# Patient Record
Sex: MALE | Race: WHITE | Employment: UNEMPLOYED | URBAN - METROPOLITAN AREA
[De-identification: names, ages, dates, MRNs, and addresses within clinical notes are randomized per-mention and may not be internally consistent; named-entity substitution may affect disease eponyms.]

---

## 2019-04-10 ENCOUNTER — OFFICE VISIT (OUTPATIENT)
Dept: ORTHOPEDIC SURGERY | Facility: CLINIC | Age: 44
End: 2019-04-10

## 2019-04-10 VITALS
OXYGEN SATURATION: 97 % | WEIGHT: 210 LBS | HEART RATE: 76 BPM | RESPIRATION RATE: 16 BRPM | TEMPERATURE: 97.5 F | HEIGHT: 68 IN | BODY MASS INDEX: 31.83 KG/M2 | SYSTOLIC BLOOD PRESSURE: 124 MMHG | DIASTOLIC BLOOD PRESSURE: 85 MMHG

## 2019-04-10 DIAGNOSIS — S73.191A TEAR OF RIGHT ACETABULAR LABRUM, INITIAL ENCOUNTER: ICD-10-CM

## 2019-04-10 DIAGNOSIS — M16.11 PRIMARY OSTEOARTHRITIS OF RIGHT HIP: ICD-10-CM

## 2019-04-10 DIAGNOSIS — M25.551 RIGHT HIP PAIN: Primary | ICD-10-CM

## 2019-04-10 RX ORDER — TRIAMCINOLONE ACETONIDE 40 MG/ML
40 INJECTION, SUSPENSION INTRA-ARTICULAR; INTRAMUSCULAR ONCE
Qty: 1 ML | Refills: 0 | Status: CANCELLED
Start: 2019-04-10 | End: 2019-04-10
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
## 2019-04-19 ENCOUNTER — HOSPITAL ENCOUNTER (OUTPATIENT)
Dept: GENERAL RADIOLOGY | Age: 44
Discharge: HOME | End: 2019-04-19
Attending: ORTHOPAEDIC SURGERY
Payer: OTHER GOVERNMENT

## 2019-04-19 DIAGNOSIS — M25.551 RIGHT HIP PAIN: ICD-10-CM

## 2019-04-19 DIAGNOSIS — M16.11 PRIMARY OSTEOARTHRITIS OF RIGHT HIP: ICD-10-CM

## 2019-04-19 PROCEDURE — 77002 NEEDLE LOCALIZATION BY XRAY: CPT

## 2019-04-19 PROCEDURE — 74011250636 HC RX REV CODE- 250/636: Performed by: ORTHOPAEDIC SURGERY

## 2019-04-19 PROCEDURE — 74011636320 HC RX REV CODE- 636/320: Performed by: ORTHOPAEDIC SURGERY

## 2019-04-19 RX ORDER — METHYLPREDNISOLONE ACETATE 40 MG/ML
40 INJECTION, SUSPENSION INTRA-ARTICULAR; INTRALESIONAL; INTRAMUSCULAR; SOFT TISSUE
Status: COMPLETED | OUTPATIENT
Start: 2019-04-19 | End: 2019-04-19

## 2019-04-19 RX ORDER — LIDOCAINE HYDROCHLORIDE 10 MG/ML
30 INJECTION, SOLUTION EPIDURAL; INFILTRATION; INTRACAUDAL; PERINEURAL
Status: COMPLETED | OUTPATIENT
Start: 2019-04-19 | End: 2019-04-19

## 2019-04-19 RX ADMIN — LIDOCAINE HYDROCHLORIDE 15 ML: 10 INJECTION, SOLUTION EPIDURAL; INFILTRATION; INTRACAUDAL; PERINEURAL at 14:02

## 2019-04-19 RX ADMIN — METHYLPREDNISOLONE ACETATE 40 MG: 40 INJECTION, SUSPENSION INTRA-ARTICULAR; INTRALESIONAL; INTRAMUSCULAR; SOFT TISSUE at 14:01

## 2019-04-19 RX ADMIN — IOPAMIDOL 10 ML: 408 INJECTION, SOLUTION INTRATHECAL at 14:01

## 2019-05-15 ENCOUNTER — OFFICE VISIT (OUTPATIENT)
Dept: ORTHOPEDIC SURGERY | Facility: CLINIC | Age: 44
End: 2019-05-15

## 2019-05-15 VITALS
DIASTOLIC BLOOD PRESSURE: 77 MMHG | HEART RATE: 66 BPM | OXYGEN SATURATION: 95 % | SYSTOLIC BLOOD PRESSURE: 119 MMHG | HEIGHT: 68 IN | WEIGHT: 202 LBS | TEMPERATURE: 97.6 F | BODY MASS INDEX: 30.62 KG/M2 | RESPIRATION RATE: 16 BRPM

## 2019-05-15 DIAGNOSIS — M16.11 PRIMARY OSTEOARTHRITIS OF RIGHT HIP: ICD-10-CM

## 2019-05-15 DIAGNOSIS — S73.191A TEAR OF RIGHT ACETABULAR LABRUM, INITIAL ENCOUNTER: ICD-10-CM

## 2019-05-15 DIAGNOSIS — M25.551 RIGHT HIP PAIN: Primary | ICD-10-CM
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
## 2019-05-28 ENCOUNTER — HOSPITAL ENCOUNTER (OUTPATIENT)
Dept: GENERAL RADIOLOGY | Age: 44
Discharge: HOME | End: 2019-05-28
Attending: ORTHOPAEDIC SURGERY
Payer: OTHER GOVERNMENT

## 2019-05-28 ENCOUNTER — HOSPITAL ENCOUNTER (OUTPATIENT)
Dept: MRI IMAGING | Age: 44
Discharge: HOME | End: 2019-05-28
Attending: ORTHOPAEDIC SURGERY
Payer: OTHER GOVERNMENT

## 2019-05-28 DIAGNOSIS — S73.191A TEAR OF RIGHT ACETABULAR LABRUM, INITIAL ENCOUNTER: ICD-10-CM

## 2019-05-28 DIAGNOSIS — M25.551 RIGHT HIP PAIN: ICD-10-CM

## 2019-05-28 PROCEDURE — 73722 MRI JOINT OF LWR EXTR W/DYE: CPT

## 2019-05-28 PROCEDURE — 74011000250 HC RX REV CODE- 250: Performed by: ORTHOPAEDIC SURGERY

## 2019-05-28 PROCEDURE — A9575 INJ GADOTERATE MEGLUMI 0.1ML: HCPCS | Performed by: ORTHOPAEDIC SURGERY

## 2019-05-28 PROCEDURE — 77002 NEEDLE LOCALIZATION BY XRAY: CPT

## 2019-05-28 PROCEDURE — 74011250636 HC RX REV CODE- 250/636: Performed by: ORTHOPAEDIC SURGERY

## 2019-05-28 PROCEDURE — 74011636320 HC RX REV CODE- 636/320: Performed by: ORTHOPAEDIC SURGERY

## 2019-05-28 RX ORDER — SODIUM CHLORIDE 9 MG/ML
10 INJECTION INTRAMUSCULAR; INTRAVENOUS; SUBCUTANEOUS
Status: COMPLETED | OUTPATIENT
Start: 2019-05-28 | End: 2019-05-28

## 2019-05-28 RX ORDER — LIDOCAINE HYDROCHLORIDE 10 MG/ML
30 INJECTION, SOLUTION EPIDURAL; INFILTRATION; INTRACAUDAL; PERINEURAL
Status: COMPLETED | OUTPATIENT
Start: 2019-05-28 | End: 2019-05-28

## 2019-05-28 RX ORDER — GADOTERATE MEGLUMINE 376.9 MG/ML
5 INJECTION INTRAVENOUS
Status: COMPLETED | OUTPATIENT
Start: 2019-05-28 | End: 2019-05-28

## 2019-05-28 RX ADMIN — LIDOCAINE HYDROCHLORIDE 10 ML: 10 INJECTION, SOLUTION EPIDURAL; INFILTRATION; INTRACAUDAL; PERINEURAL at 09:57

## 2019-05-28 RX ADMIN — GADOTERATE MEGLUMINE 0.15 ML: 376.9 INJECTION INTRAVENOUS at 09:56

## 2019-05-28 RX ADMIN — IOPAMIDOL 10 ML: 408 INJECTION, SOLUTION INTRATHECAL at 09:57

## 2019-05-28 RX ADMIN — SODIUM CHLORIDE 10 ML: 9 INJECTION, SOLUTION INTRAMUSCULAR; INTRAVENOUS; SUBCUTANEOUS at 09:56

## 2019-07-22 ENCOUNTER — TELEPHONE (OUTPATIENT)
Dept: ORTHOPEDIC SURGERY | Facility: CLINIC | Age: 44
End: 2019-07-22

## 2019-07-22 NOTE — TELEPHONE ENCOUNTER
Patient called for Danny Maldonado. Patient said he had his MRI done for his Right Hip at [facility] over a month ago, and has not heard from anyone. That no one has called him. Patient was advised to schedule an appointment, but the patient repeated that no one has called him. That it has been a month since he had the Mri done. Patient would like a call back with the results. Patient tel. [phone]. Note : [facility]. His [facility] Referral will  on 2019.   Patient aware Danny Maldnoado is in [place].

## 2019-07-23 NOTE — TELEPHONE ENCOUNTER
Spoke with patient, notified him of SANCHO Crisostomo's message and he verbalized understanding. Patient was transferred to the [facility] to schedule an appointment with Dr. Diane Zuñiga.

## 2022-10-27 ENCOUNTER — OFFICE VISIT (OUTPATIENT)
Dept: ORTHOPEDIC SURGERY | Age: 47
End: 2022-10-27
Payer: OTHER GOVERNMENT

## 2022-10-27 VITALS — HEIGHT: 68 IN | WEIGHT: 194 LBS | BODY MASS INDEX: 29.4 KG/M2

## 2022-10-27 DIAGNOSIS — M76.891 TENDINITIS OF RIGHT HIP: ICD-10-CM

## 2022-10-27 DIAGNOSIS — S73.191A TEAR OF RIGHT ACETABULAR LABRUM, INITIAL ENCOUNTER: Primary | ICD-10-CM

## 2022-10-27 DIAGNOSIS — M16.11 PRIMARY OSTEOARTHRITIS OF RIGHT HIP: ICD-10-CM

## 2022-10-27 PROCEDURE — 99214 OFFICE O/P EST MOD 30 MIN: CPT | Performed by: ORTHOPAEDIC SURGERY

## 2022-10-27 NOTE — PROGRESS NOTES
Patient: Enio Lopez                MRN: [id number]       SSN: [id number]  YOB: 1975        AGE: 41 y.o. SEX: male  Body mass index is 29.5 kg/m². PCP: Mandi Cordova MD  10/27/22    MrSwetha Back returns reevaluation of right-sided hip pain is had a recent MRI evaluation which confirms labral tear he declined having a plain x-ray but the MRI is just indicative of mild arthritis at this point in time    He gives a good history for right hip pain its groin related if he twists or turns he will get catching and locking he tries to avoid that it can happen on a daily basis and is directly in the groin no radiculopathy backs fairly quiescent denies fevers chills night sweats or weight loss.   The examination at today he actually walks with a mildly Trendelenburg gait owing to that side mild antalgic gait which tends to smooth out left hip rotates normally the right hip has good motion but with flexion adduction internal rotation reproduces his groin discomfort little bit of hip flexor tendinitis the bursa's not particularly tender low backs minimally tender as well    There are no x-rays to review today    MRI confirms a significant labral tear    I think this gentleman will we will require an arthroscopy I will asked Dr. Savita Garcia to have a look at them to see if he is comfortable with this otherwise we could always make an outside referral    I would recommend an x-ray on a yearly basis if he is happy to follow-up with me afterwards and he had a previous intra-articular injection which helped transiently I think his tear is little bit worse than it was previously    REVIEW OF SYSTEMS:      CON: negative  EYE: negative   ENT: negative  RESP: negative  GI:    negative   :  negative  MSK: Positive  A twelve point review of systems was completed, positives noted and all other systems were reviewed and are negative          Past Medical History:   Diagnosis Date    Hypertension Stroke [facility])        Family History   Problem Relation Age of Onset    Diabetes Mother     Heart Disease Father     Stroke Father        Current Outpatient Medications   Medication Sig Dispense Refill    BUPROPION HCL PO Take  by mouth. LISINOPRIL PO Take  by mouth. No Known Allergies    No past surgical history on file. Social History     Socioeconomic History    Marital status:      Spouse name: Not on file    Number of children: Not on file    Years of education: Not on file    Highest education level: Not on file   Occupational History    Not on file   Tobacco Use    Smoking status: Every Day    Smokeless tobacco: Never   Substance and Sexual Activity    Alcohol use: Yes    Drug use: Yes     Types: Marijuana, Cocaine    Sexual activity: Not on file   Other Topics Concern    Not on file   Social History Narrative    Not on file     Social Determinants of Health     Financial Resource Strain: Not on file   Food Insecurity: Not on file   Transportation Needs: Not on file   Physical Activity: Not on file   Stress: Not on file   Social Connections: Not on file   Intimate Partner Violence: Not on file   Housing Stability: Not on file       Visit Vitals  Ht 5' 8\" (1.727 m)   Wt 88 kg (194 lb)   BMI 29.50 kg/m²         PHYSICAL EXAMINATION:  GENERAL: Alert and oriented x3, in no acute distress, well-developed, well-nourished, afebrile. HEART: No JVD. EYES: No scleral icterus   NECK: No significant lymphadenopathy   LUNGS: No respiratory compromise or indrawing  ABDOMEN: Soft, non-tender, non-distended. Note: This note was completed using voice recognition software. Any typographical/name errors or mistakes are unintentional.    Electronically signed by:  Trisha Harris MD